# Patient Record
Sex: MALE | Race: WHITE | Employment: STUDENT | URBAN - METROPOLITAN AREA
[De-identification: names, ages, dates, MRNs, and addresses within clinical notes are randomized per-mention and may not be internally consistent; named-entity substitution may affect disease eponyms.]

---
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
## 2021-01-25 ENCOUNTER — HOSPITAL ENCOUNTER (EMERGENCY)
Facility: CLINIC | Age: 21
Discharge: HOME | End: 2021-01-25
Attending: PHYSICIAN ASSISTANT | Admitting: PHYSICIAN ASSISTANT
Payer: COMMERCIAL

## 2021-01-25 VITALS
DIASTOLIC BLOOD PRESSURE: 76 MMHG | HEART RATE: 110 BPM | OXYGEN SATURATION: 98 % | TEMPERATURE: 97.8 F | SYSTOLIC BLOOD PRESSURE: 106 MMHG | RESPIRATION RATE: 14 BRPM | BODY MASS INDEX: 19.62 KG/M2 | WEIGHT: 130 LBS

## 2021-01-25 DIAGNOSIS — J02.9 ACUTE PHARYNGITIS, UNSPECIFIED ETIOLOGY: ICD-10-CM

## 2021-01-25 LAB
DEPRECATED S PYO AG THROAT QL EIA: NEGATIVE
SPECIMEN SOURCE: NORMAL
SPECIMEN SOURCE: NORMAL
STREP GROUP A PCR: NOT DETECTED

## 2021-01-25 PROCEDURE — 99213 OFFICE O/P EST LOW 20 MIN: CPT | Performed by: PHYSICIAN ASSISTANT

## 2021-01-25 PROCEDURE — 999N001174 HC STATISTIC STREP A RAPID: Performed by: PHYSICIAN ASSISTANT

## 2021-01-25 PROCEDURE — G0463 HOSPITAL OUTPT CLINIC VISIT: HCPCS | Performed by: PHYSICIAN ASSISTANT

## 2021-01-25 PROCEDURE — 87651 STREP A DNA AMP PROBE: CPT | Performed by: PHYSICIAN ASSISTANT

## 2021-01-25 ASSESSMENT — ENCOUNTER SYMPTOMS
FEVER: 0
COUGH: 0
MUSCULOSKELETAL NEGATIVE: 1
SORE THROAT: 1
RESPIRATORY NEGATIVE: 1
CONSTITUTIONAL NEGATIVE: 1

## 2021-01-25 NOTE — ED PROVIDER NOTES
History     Chief Complaint   Patient presents with     Pharyngitis     sore throat for 2 days, no cough     HPI  Michael Kim is a 21 year old male who presents with complaints of sore throat for the past 2 days.  Denies fevers, chills, cough, sinus pressure, nasal congestion, nausea, vomiting, chest pain, or shortness of breath.  Patient denies any known ill contacts.      Allergies:  No Known Allergies    Problem List:    There are no active problems to display for this patient.       Past Medical History:    Past Medical History:   Diagnosis Date     ALLERGIC RHINITIS NOS 9/7/2006     TENSION HEADACHE 9/7/2006     Undiagnosed cardiac murmurs        Past Surgical History:    History reviewed. No pertinent surgical history.    Family History:    Family History   Problem Relation Age of Onset     Hypertension Father      Asthma Father      Diabetes Father      Lipids Father         high cholesterol     Neurologic Disorder Father         Migraine-like headaches as child     Allergies Father         Needs meds year-round-sinus surgeries     Skin Cancer Father      Breast Cancer Paternal Grandmother      Allergies Mother         Seasonal     Depression Mother        Social History:  Marital Status:  Single [1]  Social History     Tobacco Use     Smoking status: Never Smoker     Smokeless tobacco: Never Used   Substance Use Topics     Alcohol use: No     Drug use: No        Medications:    No current outpatient medications on file.        Review of Systems   Constitutional: Negative.  Negative for fever.   HENT: Positive for sore throat.    Respiratory: Negative.  Negative for cough.    Musculoskeletal: Negative.    Skin: Negative.    All other systems reviewed and are negative.      Physical Exam   BP: 106/76  Pulse: 110  Temp: 97.8  F (36.6  C)  Resp: 14  Weight: 59 kg (130 lb)  SpO2: 98 %      Physical Exam  Constitutional:       General: He is not in acute distress.     Appearance: He is well-developed. He  is not ill-appearing, toxic-appearing or diaphoretic.   HENT:      Head: Normocephalic and atraumatic.      Right Ear: Tympanic membrane, ear canal and external ear normal.      Left Ear: Tympanic membrane, ear canal and external ear normal.      Nose: Nose normal. No rhinorrhea.      Mouth/Throat:      Lips: Pink.      Mouth: Mucous membranes are moist.      Pharynx: Uvula midline. Posterior oropharyngeal erythema present. No pharyngeal swelling, oropharyngeal exudate or uvula swelling.      Tonsils: No tonsillar exudate or tonsillar abscesses.   Eyes:      Conjunctiva/sclera: Conjunctivae normal.      Pupils: Pupils are equal, round, and reactive to light.   Neck:      Musculoskeletal: Normal range of motion and neck supple. No neck rigidity.   Cardiovascular:      Rate and Rhythm: Normal rate and regular rhythm.      Heart sounds: Normal heart sounds.   Pulmonary:      Effort: Pulmonary effort is normal. No respiratory distress.      Breath sounds: Normal breath sounds. No stridor. No wheezing, rhonchi or rales.   Lymphadenopathy:      Cervical: No cervical adenopathy.   Skin:     General: Skin is warm and dry.   Neurological:      Mental Status: He is alert and oriented to person, place, and time.         ED Course        Procedures    Results for orders placed or performed during the hospital encounter of 01/25/21 (from the past 24 hour(s))   Streptococcus A Rapid Scr w Reflx to PCR    Specimen: Throat   Result Value Ref Range    Strep Specimen Description Throat     Streptococcus Group A Rapid Screen Negative NEG^Negative       Medications - No data to display    Assessments & Plan (with Medical Decision Making)     Pt is a 21 year old male who presents with complaints of sore throat for the past 2 days.  No known ill contacts    Pt is afebrile on arrival.  Exam as above.  Rapid strep was negative.  Culture is pending.  Discussed results with patient.  Encouraged symptomatic treatments at home.  Return  precautions were reviewed.  Hand-outs were provided.    Patient was instructed to follow-up with PCP if no improvement in 3-5 days for continued care and management or sooner if new or worsening symptoms.  He is to return to the ED for persistent and/or worsening symptoms.  Patient expressed understanding of the diagnosis and plan and was discharged home in good condition.    I have reviewed the nursing notes.    I have reviewed the findings, diagnosis, plan and need for follow up with the patient.    There are no discharge medications for this patient.      Final diagnoses:   Acute pharyngitis, unspecified etiology       1/25/2021   [facility] EMERGENCY DEPT      Disclaimer:  This note consists of symbols derived from keyboarding, dictation and/or voice recognition software.  As a result, there may be errors in the script that have gone undetected.  Please consider this when interpreting information found in this chart.     Lora Frye PA-C  01/25/21 1310

## 2021-01-25 NOTE — ED TRIAGE NOTES
Patient presents today with complaints with swollen and sore throat. Symptoms started two days ago . Arrived to urgent care ambulatory .

## 2021-09-18 ENCOUNTER — HEALTH MAINTENANCE LETTER (OUTPATIENT)
Age: 21
End: 2021-09-18

## 2022-01-08 ENCOUNTER — HEALTH MAINTENANCE LETTER (OUTPATIENT)
Age: 22
End: 2022-01-08

## 2022-11-20 ENCOUNTER — HEALTH MAINTENANCE LETTER (OUTPATIENT)
Age: 22
End: 2022-11-20

## 2023-04-15 ENCOUNTER — HEALTH MAINTENANCE LETTER (OUTPATIENT)
Age: 23
End: 2023-04-15